# Patient Record
Sex: MALE | Race: WHITE | NOT HISPANIC OR LATINO | Employment: FULL TIME | URBAN - METROPOLITAN AREA
[De-identification: names, ages, dates, MRNs, and addresses within clinical notes are randomized per-mention and may not be internally consistent; named-entity substitution may affect disease eponyms.]

---

## 2022-11-30 ENCOUNTER — APPOINTMENT (OUTPATIENT)
Dept: RADIOLOGY | Facility: CLINIC | Age: 27
End: 2022-11-30

## 2022-11-30 VITALS
WEIGHT: 199 LBS | DIASTOLIC BLOOD PRESSURE: 70 MMHG | BODY MASS INDEX: 26.95 KG/M2 | SYSTOLIC BLOOD PRESSURE: 116 MMHG | HEIGHT: 72 IN

## 2022-11-30 DIAGNOSIS — M65.4 DE QUERVAIN'S TENOSYNOVITIS, RIGHT: Primary | ICD-10-CM

## 2022-11-30 DIAGNOSIS — M25.531 PAIN IN RIGHT WRIST: ICD-10-CM

## 2022-11-30 RX ORDER — BETAMETHASONE SODIUM PHOSPHATE AND BETAMETHASONE ACETATE 3; 3 MG/ML; MG/ML
3 INJECTION, SUSPENSION INTRA-ARTICULAR; INTRALESIONAL; INTRAMUSCULAR; SOFT TISSUE
Status: COMPLETED | OUTPATIENT
Start: 2022-11-30 | End: 2022-11-30

## 2022-11-30 RX ORDER — LIDOCAINE HYDROCHLORIDE 10 MG/ML
1 INJECTION, SOLUTION INFILTRATION; PERINEURAL
Status: COMPLETED | OUTPATIENT
Start: 2022-11-30 | End: 2022-11-30

## 2022-11-30 RX ADMIN — BETAMETHASONE SODIUM PHOSPHATE AND BETAMETHASONE ACETATE 3 MG: 3; 3 INJECTION, SUSPENSION INTRA-ARTICULAR; INTRALESIONAL; INTRAMUSCULAR; SOFT TISSUE at 16:00

## 2022-11-30 RX ADMIN — LIDOCAINE HYDROCHLORIDE 1 ML: 10 INJECTION, SOLUTION INFILTRATION; PERINEURAL at 16:00

## 2022-11-30 NOTE — PROGRESS NOTES
Assessment:     1  De Quervain's tenosynovitis, right        Plan:     Problem List Items Addressed This Visit        Musculoskeletal and Integument    De Quervain's tenosynovitis, right - Primary     Findings consistent with right de Quervain tenosynovitis  Findings and treatment options were discussed with the patient  X-rays reviewed with him  Prognosis was discussed  Recommend cortisone injection to the 1st dorsal compartment to reduce inflammation and pain  He tolerated the procedure well  Advised to apply cold compress today  He was given a prescription for the occupational therapist make a custom molded thumb spica splint to use with activities  He is to take it off at rest   He is also given a prescription to start occupational therapy treatment  Avoid repetitive activities with that hand  Follow-up in 6 weeks for re-evaluation  All patient's questions were answered to his satisfaction  This note is created using dictation transcription  It may contain typographical errors, grammatical errors, improperly dictated words, background noise and other errors  Relevant Medications    betamethasone acetate-betamethasone sodium phosphate (CELESTONE) injection 3 mg (Completed)    lidocaine (XYLOCAINE) 1 % injection 1 mL (Completed)    Other Relevant Orders    XR wrist 3+ vw right    Ambulatory Referral to PT/OT Hand Therapy    Ambulatory Referral to Occupational Therapy    Hand/upper extremity injection: R extensor compartment 1 (Completed)      Subjective:     Patient ID: Tisha Shane is a 28 y o  male  Chief Complaint: This is a 29 y/o male who presents today for intermittent right wrist pain for the last 2 months  Pt states that at the time the pain started he was working for a moving company and noticed pain along the radial aspect of his right wrist and into his right thumb  He describes the pain as sharp and states it is most severe in the morning and improves throughout the day    Pain has been intermittent since its onset  Patient has tried stretches that he found online to relieve the pain that were ineffective  Pt denies any injury or trauma to the wrist   Patient intake form reviewed  Allergy:  No Known Allergies  Medications:  all current active meds have been reviewed  Past Medical History:  History reviewed  No pertinent past medical history  Past Surgical History:  History reviewed  No pertinent surgical history  Family History:  History reviewed  No pertinent family history  Social History:  Social History     Substance and Sexual Activity   Alcohol Use None     Social History     Substance and Sexual Activity   Drug Use Not on file     Social History     Tobacco Use   Smoking Status Never   Smokeless Tobacco Never     Review of Systems   Constitutional: Negative  HENT: Negative  Eyes: Negative  Respiratory: Negative  Cardiovascular: Negative  Gastrointestinal: Negative  Endocrine: Negative  Genitourinary: Negative  Musculoskeletal: Positive for arthralgias (Right wrist)  Skin: Negative  Allergic/Immunologic: Negative  Neurological: Negative  Hematological: Negative  Psychiatric/Behavioral: Negative  Objective:  BP Readings from Last 1 Encounters:   11/30/22 116/70      Wt Readings from Last 1 Encounters:   11/30/22 90 3 kg (199 lb)      BMI:   Estimated body mass index is 26 99 kg/m² as calculated from the following:    Height as of this encounter: 6' (1 829 m)  Weight as of this encounter: 90 3 kg (199 lb)  BSA:   Estimated body surface area is 2 13 meters squared as calculated from the following:    Height as of this encounter: 6' (1 829 m)  Weight as of this encounter: 90 3 kg (199 lb)  Physical Exam  Vitals and nursing note reviewed  Constitutional:       General: He is not in acute distress  Appearance: Normal appearance  He is well-developed  HENT:      Head: Normocephalic and atraumatic        Right Ear: External ear normal       Left Ear: External ear normal       Mouth/Throat:      Mouth: Oropharynx is clear and moist    Eyes:      Extraocular Movements: Extraocular movements intact  Conjunctiva/sclera: Conjunctivae normal    Cardiovascular:      Pulses: Intact distal pulses  Pulmonary:      Effort: Pulmonary effort is normal  No respiratory distress  Musculoskeletal:      Cervical back: Neck supple  Skin:     General: Skin is warm and dry  Neurological:      Mental Status: He is alert and oriented to person, place, and time  Psychiatric:         Mood and Affect: Mood and affect and mood normal          Behavior: Behavior normal        Right Hand Exam     Tenderness   Right hand tenderness location: First dorsal compartment  Range of Motion   The patient has normal right wrist ROM  Muscle Strength   The patient has normal right wrist strength  Tests   Finkelstein's test: positive    Other   Erythema: absent  Scars: absent  Sensation: normal  Pulse: present            I have personally reviewed pertinent films in PACS and my interpretation is X-rays of right wrist reveal no bony abnormalities  Hand/upper extremity injection: R extensor compartment 1  Universal Protocol:  Consent: Verbal consent obtained    Risks and benefits: risks, benefits and alternatives were discussed  Consent given by: patient  Patient understanding: patient states understanding of the procedure being performed  Site marked: the operative site was marked  Supporting Documentation  Indications: pain   Procedure Details  Condition:de Quervain's tenosynovitis Site: R extensor compartment 1   Preparation: Patient was prepped and draped in the usual sterile fashion  Needle size: 25 G  Ultrasound guidance: no  Approach: radial  Medications administered: 3 mg betamethasone acetate-betamethasone sodium phosphate 6 (3-3) mg/mL; 1 mL lidocaine 1 %    Patient tolerance: patient tolerated the procedure well with no immediate complications  Dressing:  Sterile dressing applied         Scribe Attestation    I,:  Sari Marrero PA-C am acting as a scribe while in the presence of the attending physician :       I,:  Dagoberto Tapia MD personally performed the services described in this documentation    as scribed in my presence :

## 2022-11-30 NOTE — ASSESSMENT & PLAN NOTE
Findings consistent with right de Quervain tenosynovitis  Findings and treatment options were discussed with the patient  X-rays reviewed with him  Prognosis was discussed  Recommend cortisone injection to the 1st dorsal compartment to reduce inflammation and pain  He tolerated the procedure well  Advised to apply cold compress today  He was given a prescription for the occupational therapist make a custom molded thumb spica splint to use with activities  He is to take it off at rest   He is also given a prescription to start occupational therapy treatment  Avoid repetitive activities with that hand  Follow-up in 6 weeks for re-evaluation  All patient's questions were answered to his satisfaction  This note is created using dictation transcription  It may contain typographical errors, grammatical errors, improperly dictated words, background noise and other errors

## 2022-12-01 ENCOUNTER — OFFICE VISIT (OUTPATIENT)
Dept: OCCUPATIONAL THERAPY | Facility: CLINIC | Age: 27
End: 2022-12-01

## 2022-12-01 DIAGNOSIS — M65.4 DE QUERVAIN'S TENOSYNOVITIS, RIGHT: ICD-10-CM

## 2022-12-01 NOTE — PROGRESS NOTES
Orthosis    Diagnosis:   1  De Quervain's tenosynovitis, right  Ambulatory Referral to Occupational Therapy        Indication: Motion Blocking    Location: Right  wrist and thumb  Supplies: Custom Fit Orthotic and Skin coverage   Orthosis type: Thumb SPICA forearm-based  Wearing Schedule: 2 wks FT then night  Describe Position: function      Precautions: Universal (skin contact/breakdown)    Patient or Caregiver expresses understanding of wearing Schedule and Precautions? Yes  Patient or Caregiver able to don/doff orthotic independently? Yes    Written orders provided to patient?  Yes  Orders Obtained: Written  Orders Obtained from: Dr Diego Toribio    Return for evaluation and treatment No